# Patient Record
Sex: FEMALE | Race: BLACK OR AFRICAN AMERICAN | Employment: UNEMPLOYED | URBAN - METROPOLITAN AREA
[De-identification: names, ages, dates, MRNs, and addresses within clinical notes are randomized per-mention and may not be internally consistent; named-entity substitution may affect disease eponyms.]

---

## 2019-03-18 ENCOUNTER — HOSPITAL ENCOUNTER (EMERGENCY)
Age: 24
Discharge: HOME | End: 2019-03-18
Attending: EMERGENCY MEDICINE

## 2019-03-18 VITALS
HEIGHT: 63 IN | OXYGEN SATURATION: 100 % | SYSTOLIC BLOOD PRESSURE: 126 MMHG | BODY MASS INDEX: 23.88 KG/M2 | HEART RATE: 96 BPM | TEMPERATURE: 99.1 F | DIASTOLIC BLOOD PRESSURE: 89 MMHG | WEIGHT: 134.8 LBS | RESPIRATION RATE: 16 BRPM

## 2019-03-18 DIAGNOSIS — J06.9 ACUTE UPPER RESPIRATORY INFECTION: Primary | ICD-10-CM

## 2019-03-18 PROCEDURE — 99282 EMERGENCY DEPT VISIT SF MDM: CPT

## 2019-03-18 RX ORDER — BENZONATATE 100 MG/1
100 CAPSULE ORAL 3 TIMES DAILY PRN
Qty: 30 CAPSULE | Refills: 0 | Status: SHIPPED | OUTPATIENT
Start: 2019-03-18 | End: 2019-03-25

## 2019-03-18 RX ORDER — CETIRIZINE HYDROCHLORIDE 10 MG/1
10 TABLET ORAL DAILY
Qty: 20 TABLET | Refills: 1 | Status: SHIPPED | OUTPATIENT
Start: 2019-03-18

## 2019-03-18 RX ORDER — AZITHROMYCIN 250 MG/1
TABLET, FILM COATED ORAL
Qty: 1 PACKET | Refills: 0 | Status: SHIPPED | OUTPATIENT
Start: 2019-03-18 | End: 2019-03-28

## 2019-04-22 ENCOUNTER — HOSPITAL ENCOUNTER (EMERGENCY)
Age: 24
Discharge: HOME | End: 2019-04-22
Attending: EMERGENCY MEDICINE

## 2019-04-22 VITALS
RESPIRATION RATE: 17 BRPM | DIASTOLIC BLOOD PRESSURE: 88 MMHG | WEIGHT: 135.8 LBS | TEMPERATURE: 98.2 F | SYSTOLIC BLOOD PRESSURE: 127 MMHG | HEIGHT: 63 IN | HEART RATE: 55 BPM | OXYGEN SATURATION: 100 % | BODY MASS INDEX: 24.06 KG/M2

## 2019-04-22 DIAGNOSIS — L30.9 DERMATITIS: ICD-10-CM

## 2019-04-22 DIAGNOSIS — L50.9 URTICARIA: Primary | ICD-10-CM

## 2019-04-22 DIAGNOSIS — R00.1 BRADYCARDIA: ICD-10-CM

## 2019-04-22 PROCEDURE — 6370000000 HC RX 637 (ALT 250 FOR IP): Performed by: EMERGENCY MEDICINE

## 2019-04-22 PROCEDURE — 99282 EMERGENCY DEPT VISIT SF MDM: CPT

## 2019-04-22 RX ORDER — FAMOTIDINE 20 MG/1
20 TABLET, FILM COATED ORAL 2 TIMES DAILY
Qty: 28 TABLET | Refills: 0 | Status: SHIPPED | OUTPATIENT
Start: 2019-04-22 | End: 2019-05-06

## 2019-04-22 RX ORDER — FAMOTIDINE 20 MG/1
20 TABLET, FILM COATED ORAL ONCE
Status: COMPLETED | OUTPATIENT
Start: 2019-04-22 | End: 2019-04-22

## 2019-04-22 RX ORDER — PREDNISONE 50 MG/1
50 TABLET ORAL DAILY
Qty: 4 TABLET | Refills: 0 | Status: SHIPPED | OUTPATIENT
Start: 2019-04-22 | End: 2019-04-26

## 2019-04-22 RX ORDER — DIPHENHYDRAMINE HCL 25 MG
25 CAPSULE ORAL EVERY 6 HOURS PRN
Qty: 12 CAPSULE | Refills: 0 | Status: SHIPPED | OUTPATIENT
Start: 2019-04-22 | End: 2019-05-02

## 2019-04-22 RX ORDER — PREDNISONE 20 MG/1
60 TABLET ORAL ONCE
Status: COMPLETED | OUTPATIENT
Start: 2019-04-22 | End: 2019-04-22

## 2019-04-22 RX ADMIN — PREDNISONE 60 MG: 20 TABLET ORAL at 21:44

## 2019-04-22 RX ADMIN — FAMOTIDINE 20 MG: 20 TABLET ORAL at 21:44

## 2019-04-23 NOTE — ED PROVIDER NOTES
Emergency Physician Note    Chief Complaint  Rash       History of Present Illness  Kari Maynard is a 20 y.o. female who presents to the ED for rash. Starting 4 days ago patient noticed a rash on them anterior part of her chest that was itchy. She has not tried any topical treatment nor has she tried any Benadryl. The rash started to spread and now includes her upper back and her breasts and her abdomen but does not include her upper extremities nor her lower extremities. Denies any sensation of her throat closing, denies any shortness of breath. Denies any difficulty swallowing. Denies any new clothes recently and denies any recent household product changes. Denies fever, chills, malaise, chest pain, shortness of breath, cough, abdominal pain, nausea, vomiting, diarrhea, headache, sore throat, dysuria, back pain. No palliative/provocative factors. Positives and pertinent negatives as per HPI. All other of the 10 systems were reviewed and are negative. I have reviewed the following from the nursing documentation:      Prior to Admission medications    Medication Sig Start Date End Date Taking? Authorizing Provider   predniSONE (DELTASONE) 50 MG tablet Take 1 tablet by mouth daily for 4 days 4/22/19 4/26/19 Yes Augustin Lowry MD   diphenhydrAMINE (BENADRYL) 25 MG capsule Take 1 capsule by mouth every 6 hours as needed for Itching 4/22/19 5/2/19 Yes Augustin Lowry MD   famotidine (PEPCID) 20 MG tablet Take 1 tablet by mouth 2 times daily for 14 days 4/22/19 5/6/19 Yes Augustin Lowry MD   [facility]) 10 MG tablet Take 1 tablet by mouth daily 3/18/19  Yes Bella Hernandez MD   DM-Doxylamine-Acetaminophen [facility] - JULIUS COLD & FLU PO) Take by mouth    Historical Provider, MD       Allergies as of 04/22/2019    (No Known Allergies)       History reviewed. No pertinent past medical history. Surgical History: History reviewed. No pertinent surgical history.      Family History:  History reviewed. No pertinent family history. Social History     Socioeconomic History    Marital status: Single     Spouse name: Not on file    Number of children: Not on file    Years of education: Not on file    Highest education level: Not on file   Occupational History    Not on file   Social Needs    Financial resource strain: Not on file    Food insecurity:     Worry: Not on file     Inability: Not on file    Transportation needs:     Medical: Not on file     Non-medical: Not on file   Tobacco Use    Smoking status: Current Every Day Smoker    Smokeless tobacco: Never Used   Substance and Sexual Activity    Alcohol use: Yes    Drug use: Not on file    Sexual activity: Not on file   Lifestyle    Physical activity:     Days per week: Not on file     Minutes per session: Not on file    Stress: Not on file   Relationships    Social connections:     Talks on phone: Not on file     Gets together: Not on file     Attends [employer] service: Not on file     Active member of club or organization: Not on file     Attends meetings of clubs or organizations: Not on file     Relationship status: Not on file    Intimate partner violence:     Fear of current or ex partner: Not on file     Emotionally abused: Not on file     Physically abused: Not on file     Forced sexual activity: Not on file   Other Topics Concern    Not on file   Social History Narrative    Not on file       Nursing notes reviewed. ED Triage Vitals [04/22/19 2120]   Enc Vitals Group      /88      Pulse 55      Resp 17      Temp 98.2 °F (36.8 °C)      Temp Source Oral      SpO2 100 %      Weight 135 lb 12.8 oz (61.6 kg)      Height 5' 3\" (1.6 m)      Head Circumference       Peak Flow       Pain Score       Pain Loc       Pain Edu? Excl. in [address]? GENERAL:  Awake, alert. Well developed, well nourished with no apparent distress. HENT:  Normocephalic, Atraumatic, no lacerations.   Oropharynx clear, no tonsilar inflammation, no tonsilar exudates, no airway obstruction, moist mucous membranes. Uvula was midline and has symmetric rise. EYES:  Conjunctiva normal, Pupils equal round and reactive to light, extraocular movements normal.  NECK:  Trachea is midline. No stridor. CHEST:  Regular rate and regular rhythm, no murmurs/rubs/gallops, normal S1/S2, chest wall non-tender. LUNGS:  No respiratory distress. No abdominal retractions, no sternal retractions. Clear to auscultation bilaterally, no wheezing, no rhochi, no rales  ABDOMEN:  Soft, non-tender, non-distended. No guarding and no rebound. No costovertebral angle tenderness to palpation. Normal BS, no organomegaly, no abdominal masses  EXTREMITIES:  Normal range of motion, no edema, no tenderness, no deformity, distal pulses present and equal bilaterally. Moves extremities x4 with purpose. BACK:  No midline tenderness in the cervical, thoracic, and lumbar spine. No deformities, no step-off. Palpation did not elicit any paraspinous tenderness. SKIN: Warm, dry and intact. Urticarial rash covering the upper chest wall her breasts and her abdomen as well as her upper back. No rash on the palms or soles of her feet, no skin sloughing, no ecchymosis, no streaking  NEUROLOGIC: Normal mental status. Moving all extremities to command. Alert and oriented x4 without focal deficit or gross sensory deficit. Normal speech. Strength 5/5 in bilateral upper and lower extremities. Sensation is intact in the upper and lower extremities. PSYCHIATRIC: Not anxious, normal mood and affect, thoughts are linear and organized, without delusions/hallucinations, responds appropriately to questions      LABS and [address] [facility]    Procedures/interventions/images ordered for this visit  No orders of the defined types were placed in this encounter.       Medications ordered for this visit  Orders Placed This Encounter   Medications    predniSONE (DELTASONE) tablet 60 mg    famotidine (PEPCID) tablet 20 mg    predniSONE (DELTASONE) 50 MG tablet     Sig: Take 1 tablet by mouth daily for 4 days     Dispense:  4 tablet     Refill:  0    diphenhydrAMINE (BENADRYL) 25 MG capsule     Sig: Take 1 capsule by mouth every 6 hours as needed for Itching     Dispense:  12 capsule     Refill:  0    famotidine (PEPCID) 20 MG tablet     Sig: Take 1 tablet by mouth 2 times daily for 14 days     Dispense:  28 tablet     Refill:  0     Patient does have bradycardia and her vital signs but given she is very young and she does report that she is a runner, I believe that a pulse of 55 is within normal limits for this patient. She denies any lightheadedness, shortness of breath, syncope. This is a pleasant patient with a rash of uncertain etiology. Pt is afebrile, in NAD and nontoxic in appearance. There is no petechiae or purpura. There is no angioedema or respiratory symptoms. It was explained to the pt/family that the definite etiology has not been identified. Pt was counseled to return to the ED if they develop fever, swelling, shortness of breath, or otherwise worsen. They were counseled on close follow-up with their PMD and specifically with a dermatologist if the symptoms persist.  There is no significant evidence of an allergic reaction or anaphylaxis, sepsis, meningitis or meningococcemia, vasculitis, TSS, SJS, necrotizing fasciitis, or other process requiring immediate medical or surgical intervention. Rash associated with an autoimmune disorder as not been ruled out. It is understood that if the patient is not improving as expected, or if other new signs or symptoms of concern develop, other etiologies or diagnoses may need to be considered that may require other tests, treatments, consultations, and/or admission. In the meantime, treatment is supportive. If there is pruritis, antihistamines should be given.   The diagnosis, plan, expected course, follow-up, and return precautions were discussed and all questions were answered. Final Impression    1. Urticaria    2. Dermatitis    3. Bradycardia        Blood pressure 127/88, pulse 55, temperature 98.2 °F (36.8 °C), temperature source Oral, resp. rate 17, height 5' 3\" (1.6 m), weight 61.6 kg (135 lb 12.8 oz), SpO2 100 %. Patient and/or companions verbalized understanding of the ED workup, any relevant findings as well as any incidental findings, and the disposition and plan. Questions sought and answered with the patient and/or family members. They voice understanding and agree with plan. If the patient was discharged from the ED, they were instructed to return for any worsening or worrisome concerns. Patient was given scripts for the following medications. I counseled patient how to take these medications. Discharge Medication List as of 4/22/2019  9:35 PM      START taking these medications    Details   predniSONE (DELTASONE) 50 MG tablet Take 1 tablet by mouth daily for 4 days, Disp-4 tablet, R-0Print      diphenhydrAMINE (BENADRYL) 25 MG capsule Take 1 capsule by mouth every 6 hours as needed for Itching, Disp-12 capsule, R-0Print      famotidine (PEPCID) 20 MG tablet Take 1 tablet by mouth 2 times daily for 14 days, Disp-28 tablet, R-0Print             Disposition  Pt is in stable condition upon Discharge to home. The note was completed using Dragon voice recognition transcription. Every effort was made to ensure accuracy; however, inadvertent transcription errors may be present despite my best efforts to edit errors.     Carol Zambrano MD  [address] [place] [address]        Carol Zambrano MD  04/22/19 8254